# Patient Record
Sex: FEMALE | Race: WHITE | NOT HISPANIC OR LATINO | URBAN - METROPOLITAN AREA
[De-identification: names, ages, dates, MRNs, and addresses within clinical notes are randomized per-mention and may not be internally consistent; named-entity substitution may affect disease eponyms.]

---

## 2022-04-04 ENCOUNTER — OFFICE VISIT (OUTPATIENT)
Dept: URBAN - METROPOLITAN AREA CLINIC 63 | Facility: CLINIC | Age: 57
End: 2022-04-04

## 2022-05-13 ENCOUNTER — OFFICE VISIT (OUTPATIENT)
Dept: URBAN - METROPOLITAN AREA SURGERY CENTER 4 | Facility: SURGERY CENTER | Age: 57
End: 2022-05-13

## 2022-05-17 LAB — PATHOLOGY (INDENTED REPORT): (no result)

## 2022-06-03 ENCOUNTER — OFFICE VISIT (OUTPATIENT)
Dept: URBAN - METROPOLITAN AREA CLINIC 63 | Facility: CLINIC | Age: 57
End: 2022-06-03

## 2022-07-09 ENCOUNTER — TELEPHONE ENCOUNTER (OUTPATIENT)
Dept: URBAN - METROPOLITAN AREA CLINIC 121 | Facility: CLINIC | Age: 57
End: 2022-07-09

## 2022-07-09 RX ORDER — ESTRADIOL 0.1 MG/G
CREAM VAGINAL
Refills: 0 | OUTPATIENT
Start: 2022-04-27 | End: 2022-06-03

## 2022-07-10 ENCOUNTER — TELEPHONE ENCOUNTER (OUTPATIENT)
Dept: URBAN - METROPOLITAN AREA CLINIC 121 | Facility: CLINIC | Age: 57
End: 2022-07-10

## 2022-07-10 RX ORDER — WHEAT DEXTRIN 3 G/4 G
ONE PACKET DAILY POWDER (GRAM) ORAL TAKE AS DIRECTED
Refills: 0 | Status: ACTIVE | COMMUNITY
Start: 2022-06-03

## 2022-12-16 ENCOUNTER — WEB ENCOUNTER (OUTPATIENT)
Dept: URBAN - METROPOLITAN AREA CLINIC 63 | Facility: CLINIC | Age: 57
End: 2022-12-16

## 2022-12-16 ENCOUNTER — DASHBOARD ENCOUNTERS (OUTPATIENT)
Age: 57
End: 2022-12-16

## 2022-12-16 ENCOUNTER — OFFICE VISIT (OUTPATIENT)
Dept: URBAN - METROPOLITAN AREA CLINIC 63 | Facility: CLINIC | Age: 57
End: 2022-12-16
Payer: OTHER GOVERNMENT

## 2022-12-16 VITALS
TEMPERATURE: 97.8 F | DIASTOLIC BLOOD PRESSURE: 80 MMHG | HEART RATE: 65 BPM | HEIGHT: 65 IN | OXYGEN SATURATION: 97 % | WEIGHT: 138.6 LBS | BODY MASS INDEX: 23.09 KG/M2 | SYSTOLIC BLOOD PRESSURE: 118 MMHG

## 2022-12-16 DIAGNOSIS — K64.3 GRADE IV HEMORRHOIDS: ICD-10-CM

## 2022-12-16 PROCEDURE — 99213 OFFICE O/P EST LOW 20 MIN: CPT | Performed by: NURSE PRACTITIONER

## 2022-12-16 RX ORDER — BISACODYL 5 MG
1 TABLET AS NEEDED TABLET, DELAYED RELEASE (ENTERIC COATED) ORAL ONCE A DAY
Status: ACTIVE | COMMUNITY

## 2022-12-16 RX ORDER — EZETIMIBE 10 MG/1
1 TABLET TABLET ORAL ONCE A DAY
Status: ACTIVE | COMMUNITY

## 2022-12-16 RX ORDER — HYDROCORTISONE 10 MG/G
1 APPLICATION CREAM TOPICAL ONCE A DAY
Status: ACTIVE | COMMUNITY

## 2022-12-16 RX ORDER — COCOA BUTTER, PHENYLEPHRINE HYDROCHLORIDE 2211; 6.25 MG/1; MG/1
AS DIRECTED SUPPOSITORY RECTAL
Status: ACTIVE | COMMUNITY

## 2022-12-16 RX ORDER — ATORVASTATIN CALCIUM 80 MG/1
1 TABLET TABLET, FILM COATED ORAL ONCE A DAY
Status: ACTIVE | COMMUNITY

## 2022-12-16 RX ORDER — KRILL/OM-3/DHA/EPA/PHOSPHO/AST 1000-230MG
1 TABLET CAPSULE ORAL ONCE A DAY
Status: ACTIVE | COMMUNITY

## 2022-12-16 RX ORDER — WHEAT DEXTRIN 3 G/4 G
ONE PACKET DAILY POWDER (GRAM) ORAL TAKE AS DIRECTED
Refills: 0 | Status: ACTIVE | COMMUNITY
Start: 2022-06-03

## 2022-12-16 NOTE — HPI-TODAY'S VISIT:
Rayne Longo is a very pleasant 57-year-old female seen for hemorrhoids.  Her last complete colonoscopy was 13 May 2022 and demonstrated multiple adenomatous polyps, left sided diverticulosis and only mild internal hemorrhoids, most likely secondary to the bowel prep.  She presents today complaining of a waiting she went to 6 days ago where she was sitting for extended periods of time and had a hemorrhoid appear.  She has been treating it with suppositories, 4 times a day but still complains of rectal discomfort and some blood on the stool.  Her bowel habits became slightly constipated which was alleviated with some Dulcolax and she relates daily bowel movements since use.  She is otherwise asymptomatic from a GI perspective.

## 2022-12-16 NOTE — HPI-PREVIOUS PROCEDURES
Colonoscopy/13 May 2022.  12 mm advanced serrated adenomatous polyp removed from the descending colon.  Medium sized lipoma found in the descending colon.  Eight (8) serrated adenomatous polyps (5-7 mm) removed from the distal sigmoid colon.  A few hyperplastic polyps were found in the rectum.  The polyps were diminutive in size and disappeared upon insufflation, therefore polypectomy not indicated.  Diverticulosis in the descending and sigmoid colon with internal hemorrhoids present.  All remaining findings are negative or benign.  Recommend repeat colonoscopy in 3 years due to numerous serrated adenomatous polyps removed on this procedure and a family history of colon polyps. ********** Colonoscopy/29 August 2018.  30 mm advanced tubular adenomatous polyp removed from the proximal ascending colon and piecemeal fashion using a hot snare.  Medium sized lipoma in the mid ascending colon.  4 advanced tubular adenomatous and hyperplastic polyps removed from the sigmoid colon (6 - 22 millimeters).  26 mm hyperplastic polyp removed from the rectosigmoid colon at 15 cm proximal to the anus.  Diverticulosis in the descending and sigmoid colon with internal hemorrhoids present.  All remaining findings are negative are benign.  Recommend repeat colonoscopy in 2 years due to multiple, advanced adenomatous polyp removed on this procedure.